# Patient Record
Sex: MALE | Race: WHITE | NOT HISPANIC OR LATINO | Employment: OTHER | URBAN - METROPOLITAN AREA
[De-identification: names, ages, dates, MRNs, and addresses within clinical notes are randomized per-mention and may not be internally consistent; named-entity substitution may affect disease eponyms.]

---

## 2017-01-11 ENCOUNTER — TELEPHONE (OUTPATIENT)
Dept: OPHTHALMOLOGY | Facility: CLINIC | Age: 82
End: 2017-01-11

## 2017-01-12 ENCOUNTER — ANESTHESIA (OUTPATIENT)
Dept: SURGERY | Facility: HOSPITAL | Age: 82
End: 2017-01-12
Payer: MEDICARE

## 2017-01-12 ENCOUNTER — SURGERY (OUTPATIENT)
Age: 82
End: 2017-01-12

## 2017-01-12 ENCOUNTER — ANESTHESIA EVENT (OUTPATIENT)
Dept: SURGERY | Facility: HOSPITAL | Age: 82
End: 2017-01-12
Payer: MEDICARE

## 2017-01-12 PROBLEM — H25.10 SENILE NUCLEAR SCLEROSIS: Status: ACTIVE | Noted: 2017-01-12

## 2017-01-12 PROCEDURE — D9220A PRA ANESTHESIA: Mod: ANES,,, | Performed by: ANESTHESIOLOGY

## 2017-01-12 PROCEDURE — D9220A PRA ANESTHESIA: Mod: CRNA,,, | Performed by: NURSE ANESTHETIST, CERTIFIED REGISTERED

## 2017-01-12 PROCEDURE — 63600175 PHARM REV CODE 636 W HCPCS: Performed by: NURSE ANESTHETIST, CERTIFIED REGISTERED

## 2017-01-12 PROCEDURE — 25000003 PHARM REV CODE 250: Performed by: NURSE ANESTHETIST, CERTIFIED REGISTERED

## 2017-01-12 RX ORDER — MIDAZOLAM HYDROCHLORIDE 1 MG/ML
INJECTION, SOLUTION INTRAMUSCULAR; INTRAVENOUS
Status: DISCONTINUED | OUTPATIENT
Start: 2017-01-12 | End: 2017-01-12

## 2017-01-12 RX ORDER — SODIUM CHLORIDE 9 MG/ML
INJECTION, SOLUTION INTRAVENOUS CONTINUOUS PRN
Status: DISCONTINUED | OUTPATIENT
Start: 2017-01-12 | End: 2017-01-12

## 2017-01-12 RX ADMIN — PHENYLEPHRINE AND KETOROLAC 4 ML: 10.16; 2.88 INJECTION, SOLUTION, CONCENTRATE INTRAOCULAR at 11:01

## 2017-01-12 RX ADMIN — LIDOCAINE HYDROCHLORIDE 1 ML: 10 INJECTION, SOLUTION EPIDURAL; INFILTRATION; INTRACAUDAL; PERINEURAL at 11:01

## 2017-01-12 RX ADMIN — SODIUM CHONDROITIN SULFATE / SODIUM HYALURONATE 1.05 ML: 0.55-0.5 INJECTION INTRAOCULAR at 11:01

## 2017-01-12 RX ADMIN — BALANCED SALT SOLUTION 500 ML: 6.4; .75; .48; .3; 3.9; 1.7 SOLUTION OPHTHALMIC at 11:01

## 2017-01-12 RX ADMIN — SODIUM CHLORIDE: 0.9 INJECTION, SOLUTION INTRAVENOUS at 10:01

## 2017-01-12 RX ADMIN — PREDNISOLONE ACETATE 1 DROP: 10 SUSPENSION/ DROPS OPHTHALMIC at 11:01

## 2017-01-12 RX ADMIN — MIDAZOLAM HYDROCHLORIDE 0.5 MG: 1 INJECTION, SOLUTION INTRAMUSCULAR; INTRAVENOUS at 11:01

## 2017-01-12 RX ADMIN — MOXIFLOXACIN HYDROCHLORIDE 1 DROP: 5 SOLUTION/ DROPS OPHTHALMIC at 11:01

## 2017-01-12 RX ADMIN — LIDOCAINE HYDROCHLORIDE 1 ML: 40 INJECTION, SOLUTION RETROBULBAR; TOPICAL at 11:01

## 2017-01-12 RX ADMIN — MIDAZOLAM HYDROCHLORIDE 0.5 MG: 1 INJECTION, SOLUTION INTRAMUSCULAR; INTRAVENOUS at 10:01

## 2017-01-12 NOTE — ANESTHESIA PREPROCEDURE EVALUATION
01/12/2017  Fercho Emery is a 87 y.o., male.    [facility] Anesthesia Evaluation    I have reviewed the Patient Summary Reports.    I have reviewed the Nursing Notes.   I have reviewed the Medications.     Review of Systems  Anesthesia Hx:  No problems with previous Anesthesia    Cardiovascular:   Pacemaker Hypertension CAD  Dysrhythmias atrial fibrillation CHF     CONCLUSIONS     1 - Moderately depressed left ventricular systolic function (EF 35-40%).     2 - Right ventricular enlargement with normal systolic function.     3 - Left ventricular diastolic dysfunction GRADE III.     4 - Right ventricular enlargement with normal systolic function.     5 - Pulmonary hypertension. The estimated PA systolic pressure is 96 mmHg.     6 - Biatrial enlargement.     7 - Mild aortic regurgitation.     8 - Trivial tricuspid regurgitation.     9 - Increased central venous pressure.          Pulmonary:   COPD, severe Sleep Apnea 3L O2NC 24/7   Renal/:   Chronic Renal Disease, CRI    Musculoskeletal:   Arthritis     Neurological:   Neuromuscular Disease, sciatica       Physical Exam  General:  Well nourished    Airway/Jaw/Neck:  Airway Findings: Mouth Opening: Normal Tongue: Normal  General Airway Assessment: Adult  Mallampati: II  TM Distance: Normal, at least 6 cm  Jaw/Neck Findings:  Neck ROM: Normal ROM      Dental:  Dental Findings: Upper Dentures, Lower Dentures        Mental Status:  Mental Status Findings:  Cooperative         Anesthesia Plan  Type of Anesthesia, risks & benefits discussed:  Anesthesia Type:  general  Patient's Preference: GA  Intra-op Monitoring Plan:   Intra-op Monitoring Plan Comments:   Post Op Pain Control Plan:   Post Op Pain Control Plan Comments:   Induction:   IV  Beta Blocker:  Patient is on a Beta-Blocker and has received one dose within the past 24 hours (No further documentation required).        Informed Consent: Patient understands risks and agrees with Anesthesia plan.  Questions answered. Anesthesia consent signed with patient.  ASA Score: 4     Day of Surgery Review of History & Physical:    H&P update referred to the surgeon.         Ready For Surgery From Anesthesia Perspective.

## 2017-01-12 NOTE — ANESTHESIA RELEASE NOTE
"Anesthesia Release from PACU Note    Patient: Fercho Emery    Procedure(s) Performed: Procedure(s) (LRB):  PHACOEMULSIFICATION-ASPIRATION-CATARACT (Left)  INSERTION-INTRAOCULAR LENS (IOL) (Left)    Anesthesia type: general    Post pain: Adequate analgesia    Post assessment: no apparent anesthetic complications    Last Vitals:   Visit Vitals    /64    Pulse 63    Temp 36.9 °C (98.4 °F) (Oral)    Resp 18    Ht 5' 10.5" (1.791 m)    Wt 84.5 kg (186 lb 5 oz)    SpO2 96%    BMI 26.36 kg/m2       Post vital signs: stable    Level of consciousness: awake    Nausea/Vomiting: no nausea/no vomiting    Complications: none    Airway Patency: patent    Respiratory: spontaneous ventilation, nasal cannula    Cardiovascular: stable and blood pressure at baseline    Hydration: euvolemic  "

## 2017-01-12 NOTE — ANESTHESIA POSTPROCEDURE EVALUATION
"Anesthesia Post Evaluation    Patient: Fercho Emery    Procedure(s) Performed: Procedure(s) (LRB):  PHACOEMULSIFICATION-ASPIRATION-CATARACT (Left)  INSERTION-INTRAOCULAR LENS (IOL) (Left)    Final Anesthesia Type: general  Patient location during evaluation: PACU  Patient participation: Yes- Able to Participate  Level of consciousness: awake and alert and oriented  Post-procedure vital signs: reviewed and stable  Pain management: adequate  Airway patency: patent  PONV status at discharge: No PONV  Anesthetic complications: no      Cardiovascular status: hemodynamically stable  Respiratory status: spontaneous ventilation and nasal cannula  Hydration status: euvolemic  Follow-up not needed.        Visit Vitals    /64    Pulse 63    Temp 36.9 °C (98.4 °F) (Oral)    Resp 18    Ht 5' 10.5" (1.791 m)    Wt 84.5 kg (186 lb 5 oz)    SpO2 96%    BMI 26.36 kg/m2       Pain/Mazin Score: Pain Assessment Performed: Yes (1/12/2017 11:40 AM)  Presence of Pain: denies (1/12/2017 11:40 AM)  Mazin Score: 10 (1/12/2017 11:40 AM)      "

## 2017-01-13 ENCOUNTER — OFFICE VISIT (OUTPATIENT)
Dept: OPHTHALMOLOGY | Facility: CLINIC | Age: 82
End: 2017-01-13
Payer: MEDICARE

## 2017-01-13 DIAGNOSIS — Z98.42 STATUS POST CATARACT EXTRACTION AND INSERTION OF INTRAOCULAR LENS, LEFT: Primary | ICD-10-CM

## 2017-01-13 DIAGNOSIS — Z96.1 STATUS POST CATARACT EXTRACTION AND INSERTION OF INTRAOCULAR LENS, LEFT: Primary | ICD-10-CM

## 2017-01-13 PROCEDURE — 99999 PR PBB SHADOW E&M-EST. PATIENT-LVL III: CPT | Mod: PBBFAC,,, | Performed by: OPHTHALMOLOGY

## 2017-01-13 PROCEDURE — 99024 POSTOP FOLLOW-UP VISIT: CPT | Mod: S$GLB,,, | Performed by: OPHTHALMOLOGY

## 2017-01-13 RX ORDER — LEVOFLOXACIN 250 MG/1
TABLET ORAL
COMMUNITY
Start: 2016-11-29

## 2017-01-13 RX ORDER — CEFUROXIME AXETIL 250 MG/1
250 TABLET ORAL 2 TIMES DAILY
Refills: 0 | COMMUNITY
Start: 2016-11-22

## 2017-01-13 RX ORDER — CARVEDILOL 3.12 MG/1
3.12 TABLET ORAL 2 TIMES DAILY
Refills: 6 | COMMUNITY
Start: 2017-01-04

## 2017-01-13 NOTE — PROGRESS NOTES
HPI     DLS Dr. Mata 7/10/15     PCIOL OD - remote   S/p phaco w/IOL OS 1/12/17    1 day phaco post op OS. Pt doing well. Denies any pain.    ketorolac TID OS  PF TID OS  Vigamox TID OS         Last edited by Kevin Keen MA on 1/13/2017  1:31 PM.         Assessment /Plan     For exam results, see Encounter Report.    Status post cataract extraction and insertion of intraocular lens, left      POD #1 s/p phaco/IOL  - doing well, no issues  - continue the following drops:    vigamox or ocuflox TID x 1 wk then stop  Pred forte or durezol or dexamethasone TID x  4 wks  Ketorolac TID until runs out    -f/up 1wk, sooner PRN.

## 2017-01-27 ENCOUNTER — OFFICE VISIT (OUTPATIENT)
Dept: OPHTHALMOLOGY | Facility: CLINIC | Age: 82
End: 2017-01-27
Payer: MEDICARE

## 2017-01-27 DIAGNOSIS — Z96.1 STATUS POST CATARACT EXTRACTION AND INSERTION OF INTRAOCULAR LENS, LEFT: Primary | ICD-10-CM

## 2017-01-27 DIAGNOSIS — Z98.42 STATUS POST CATARACT EXTRACTION AND INSERTION OF INTRAOCULAR LENS, LEFT: Primary | ICD-10-CM

## 2017-01-27 PROCEDURE — 99024 POSTOP FOLLOW-UP VISIT: CPT | Mod: S$GLB,,, | Performed by: OPHTHALMOLOGY

## 2017-01-27 PROCEDURE — 99999 PR PBB SHADOW E&M-EST. PATIENT-LVL III: CPT | Mod: PBBFAC,,, | Performed by: OPHTHALMOLOGY

## 2017-01-30 NOTE — PROGRESS NOTES
HPI     DLS Dr. Mata 7/10/15     PCIOL OD - remote   S/p phaco w/IOL OS 1/12/17    2 week phaco post op OS. Pt doing well. Denies any pain. Pt wife states pt   is no longer using gtts.    ketorolac TID OS  PF TID OS  Vigamox TID OS         Last edited by Kevin Keen MA on 1/27/2017 10:11 AM.         Assessment /Plan     For exam results, see Encounter Report.    Status post cataract extraction and insertion of intraocular lens, left      PO week #1 s/p phaco/IOL -    - doing well, no issues  - okay to d/c abx gtt  - continue PF/ketoroloc TID for total of 1 month    - f/up 3-4 wks for MRx, DFE

## 2017-02-17 ENCOUNTER — TELEPHONE (OUTPATIENT)
Dept: INTERNAL MEDICINE | Facility: CLINIC | Age: 82
End: 2017-02-17

## 2017-02-17 NOTE — TELEPHONE ENCOUNTER
----- Message from Vickie Carroll sent at 2/16/2017  4:27 PM CST -----  Contact: wife/ [phone]  FYI: wIFE IS CALLING TO SAY THEY WILL HAVE TO START SEEING SOMEONE ELSE IN [facility]  Due to their age and the long ride, sorry.

## 2025-04-18 NOTE — TRANSFER OF CARE
"Anesthesia Transfer of Care Note    Patient: Fercho Emery    Procedure(s) Performed: Procedure(s) (LRB):  PHACOEMULSIFICATION-ASPIRATION-CATARACT (Left)  INSERTION-INTRAOCULAR LENS (IOL) (Left)    Patient location: PACU    Anesthesia Type: MAC    Transport from OR: Transported from OR on 2-3 L/min O2 by NC with adequate spontaneous ventilation    Post pain: adequate analgesia    Post assessment: no apparent anesthetic complications    Post vital signs: stable    Level of consciousness: awake    Nausea/Vomiting: no nausea/vomiting    Complications: none          Last vitals:   Visit Vitals    /66 (BP Location: Left arm, Patient Position: Lying, BP Method: Automatic)    Pulse 83    Temp 36.6 °C (97.8 °F) (Oral)    Resp 20    Ht 5' 10.5" (1.791 m)    Wt 84.5 kg (186 lb 5 oz)    SpO2 97%    BMI 26.36 kg/m2     " No